# Patient Record
Sex: FEMALE | Race: OTHER | Employment: FULL TIME | URBAN - METROPOLITAN AREA
[De-identification: names, ages, dates, MRNs, and addresses within clinical notes are randomized per-mention and may not be internally consistent; named-entity substitution may affect disease eponyms.]

---

## 2018-06-24 ENCOUNTER — APPOINTMENT (OUTPATIENT)
Dept: CT IMAGING | Facility: HOSPITAL | Age: 23
End: 2018-06-24
Attending: EMERGENCY MEDICINE
Payer: COMMERCIAL

## 2018-06-24 ENCOUNTER — HOSPITAL ENCOUNTER (EMERGENCY)
Facility: HOSPITAL | Age: 23
Discharge: HOME | End: 2018-06-25
Attending: EMERGENCY MEDICINE
Payer: COMMERCIAL

## 2018-06-24 ENCOUNTER — APPOINTMENT (OUTPATIENT)
Dept: ULTRASOUND IMAGING | Facility: HOSPITAL | Age: 23
End: 2018-06-24
Attending: EMERGENCY MEDICINE
Payer: COMMERCIAL

## 2018-06-24 VITALS
DIASTOLIC BLOOD PRESSURE: 71 MMHG | TEMPERATURE: 98 F | OXYGEN SATURATION: 100 % | WEIGHT: 150 LBS | SYSTOLIC BLOOD PRESSURE: 115 MMHG | HEIGHT: 65 IN | HEART RATE: 93 BPM | RESPIRATION RATE: 16 BRPM | BODY MASS INDEX: 24.99 KG/M2

## 2018-06-24 DIAGNOSIS — O00.90 ECTOPIC PREGNANCY WITHOUT INTRAUTERINE PREGNANCY, UNSPECIFIED LOCATION: Primary | ICD-10-CM

## 2018-06-24 PROCEDURE — 96376 TX/PRO/DX INJ SAME DRUG ADON: CPT

## 2018-06-24 PROCEDURE — 93005 ELECTROCARDIOGRAM TRACING: CPT

## 2018-06-24 PROCEDURE — 76817 TRANSVAGINAL US OBSTETRIC: CPT | Performed by: EMERGENCY MEDICINE

## 2018-06-24 PROCEDURE — 86900 BLOOD TYPING SEROLOGIC ABO: CPT | Performed by: EMERGENCY MEDICINE

## 2018-06-24 PROCEDURE — 86850 RBC ANTIBODY SCREEN: CPT | Performed by: EMERGENCY MEDICINE

## 2018-06-24 PROCEDURE — 96372 THER/PROPH/DIAG INJ SC/IM: CPT

## 2018-06-24 PROCEDURE — 76801 OB US < 14 WKS SINGLE FETUS: CPT | Performed by: EMERGENCY MEDICINE

## 2018-06-24 PROCEDURE — 87086 URINE CULTURE/COLONY COUNT: CPT | Performed by: EMERGENCY MEDICINE

## 2018-06-24 PROCEDURE — 93010 ELECTROCARDIOGRAM REPORT: CPT

## 2018-06-24 PROCEDURE — 80053 COMPREHEN METABOLIC PANEL: CPT | Performed by: EMERGENCY MEDICINE

## 2018-06-24 PROCEDURE — 86901 BLOOD TYPING SEROLOGIC RH(D): CPT | Performed by: EMERGENCY MEDICINE

## 2018-06-24 PROCEDURE — 99285 EMERGENCY DEPT VISIT HI MDM: CPT

## 2018-06-24 PROCEDURE — 71275 CT ANGIOGRAPHY CHEST: CPT | Performed by: EMERGENCY MEDICINE

## 2018-06-24 PROCEDURE — 81001 URINALYSIS AUTO W/SCOPE: CPT | Performed by: EMERGENCY MEDICINE

## 2018-06-24 PROCEDURE — 85025 COMPLETE CBC W/AUTO DIFF WBC: CPT | Performed by: EMERGENCY MEDICINE

## 2018-06-24 PROCEDURE — 96374 THER/PROPH/DIAG INJ IV PUSH: CPT

## 2018-06-24 PROCEDURE — 96375 TX/PRO/DX INJ NEW DRUG ADDON: CPT

## 2018-06-24 PROCEDURE — 84484 ASSAY OF TROPONIN QUANT: CPT | Performed by: EMERGENCY MEDICINE

## 2018-06-24 PROCEDURE — 74177 CT ABD & PELVIS W/CONTRAST: CPT | Performed by: EMERGENCY MEDICINE

## 2018-06-24 PROCEDURE — 96361 HYDRATE IV INFUSION ADD-ON: CPT

## 2018-06-24 PROCEDURE — 84702 CHORIONIC GONADOTROPIN TEST: CPT | Performed by: EMERGENCY MEDICINE

## 2018-06-24 RX ORDER — HYDROCODONE BITARTRATE AND ACETAMINOPHEN 5; 325 MG/1; MG/1
1-2 TABLET ORAL EVERY 4 HOURS PRN
Qty: 10 TABLET | Refills: 0 | Status: SHIPPED | OUTPATIENT
Start: 2018-06-24 | End: 2018-07-01

## 2018-06-24 RX ORDER — HYDROMORPHONE HYDROCHLORIDE 1 MG/ML
INJECTION, SOLUTION INTRAMUSCULAR; INTRAVENOUS; SUBCUTANEOUS
Status: COMPLETED
Start: 2018-06-24 | End: 2018-06-24

## 2018-06-24 RX ORDER — ESCITALOPRAM OXALATE 10 MG/1
10 TABLET ORAL DAILY
Status: ON HOLD | COMMUNITY
End: 2018-09-05

## 2018-06-24 RX ORDER — ONDANSETRON 2 MG/ML
4 INJECTION INTRAMUSCULAR; INTRAVENOUS ONCE
Status: COMPLETED | OUTPATIENT
Start: 2018-06-24 | End: 2018-06-24

## 2018-06-24 RX ORDER — LAMOTRIGINE 200 MG/1
200 TABLET ORAL DAILY
Status: ON HOLD | COMMUNITY
End: 2018-09-05

## 2018-06-24 RX ORDER — LORAZEPAM 2 MG/ML
1 INJECTION INTRAMUSCULAR ONCE
Status: COMPLETED | OUTPATIENT
Start: 2018-06-24 | End: 2018-06-24

## 2018-06-24 RX ORDER — ALPRAZOLAM 0.5 MG/1
0.5 TABLET ORAL NIGHTLY PRN
Status: ON HOLD | COMMUNITY
End: 2018-09-05

## 2018-06-24 RX ORDER — HYDROMORPHONE HYDROCHLORIDE 1 MG/ML
1 INJECTION, SOLUTION INTRAMUSCULAR; INTRAVENOUS; SUBCUTANEOUS EVERY 30 MIN PRN
Status: COMPLETED | OUTPATIENT
Start: 2018-06-24 | End: 2018-06-24

## 2018-06-25 NOTE — ED PROVIDER NOTES
Patient Seen in: [facility] Emergency Department    History   Patient presents with:  Pregnancy Issues (gynecologic)    Stated Complaint: RO ECTOPIC PREG    HPI    Patient is a 78-year-old  pregnant female whose EGA is approximately 8 weeks by except as noted above.     Physical Exam   ED Triage Vitals [06/24/18 1944]  BP: (!) 89/66  Pulse: 83  Resp: 17  Temp: 98.1 °F (36.7 °C)  Temp src: Oral  SpO2: 100 %  O2 Device: None (Room air)    Current:/81   Pulse 97   Temp 98.1 °F (36.7 °C) (Oral) following:     Ketones Urine Trace (*)     Leukocyte Esterase Urine Trace (*)     Bacteria Urine Rare (*)     Squamous Epi.  Cells Large (*)     Mucous Urine 1+ (*)     All other components within normal limits   CBC W/ DIFFERENTIAL - Abnormal; Notable for 2227  ------------------------------------------------------------  On arrival of the patient an EKG was obtained which showed no acute process. The patient was placed on continuous pulse oximetry and cardiac telemetry.   She was found to be hypotensive an WALL:  No mass or axillary adenopathy. AORTA:  No aneurysm or dissection. VASCULATURE:  No visible pulmonary arterial thrombus or attenuation. ABDOMEN/PELVIS:  LIVER:  No enlargement, atrophy, abnormal density, or significant focal lesion.     B pregnancy, unspecified location  (primary encounter diagnosis)    Disposition:  Discharge  6/24/2018 10:27 pm    Follow-up:  Tyler Sotelo MD  [place] [address] [phone]    Call in 1 day  for repeat labs and exam on Tuesday

## 2018-06-25 NOTE — ED NOTES
Medication per [facility], patient continues to c/o uncontrolled pain and appears in pain distress. Plan of care discussed and emotional support provided.

## 2018-06-25 NOTE — ED INITIAL ASSESSMENT (HPI)
Patient arrives from home with family c/o right abdominal and flank pain. States should be approximately 8 weeks gestation [facility]. Per patient was told possible ectopic and go to ER had pain.  She d

## 2018-06-25 NOTE — ED NOTES
Patient tearful continues to c/o uncontrolled pain and is c/o severe anxiety. Medication per [facility], emotional support provided.

## 2018-06-26 ENCOUNTER — ANESTHESIA EVENT (OUTPATIENT)
Dept: SURGERY | Facility: HOSPITAL | Age: 23
End: 2018-06-26

## 2018-06-26 ENCOUNTER — HOSPITAL ENCOUNTER (EMERGENCY)
Facility: HOSPITAL | Age: 23
Discharge: HOME | End: 2018-06-26
Attending: EMERGENCY MEDICINE | Admitting: OBSTETRICS & GYNECOLOGY
Payer: COMMERCIAL

## 2018-06-26 ENCOUNTER — ANESTHESIA (OUTPATIENT)
Dept: SURGERY | Facility: HOSPITAL | Age: 23
End: 2018-06-26

## 2018-06-26 ENCOUNTER — SURGERY (OUTPATIENT)
Age: 23
End: 2018-06-26

## 2018-06-26 ENCOUNTER — OFFICE VISIT (OUTPATIENT)
Dept: OBGYN CLINIC | Facility: CLINIC | Age: 23
End: 2018-06-26

## 2018-06-26 VITALS
DIASTOLIC BLOOD PRESSURE: 60 MMHG | BODY MASS INDEX: 26.03 KG/M2 | HEIGHT: 66 IN | HEART RATE: 88 BPM | WEIGHT: 162 LBS | SYSTOLIC BLOOD PRESSURE: 112 MMHG

## 2018-06-26 VITALS
TEMPERATURE: 98 F | HEIGHT: 65 IN | DIASTOLIC BLOOD PRESSURE: 81 MMHG | HEART RATE: 88 BPM | SYSTOLIC BLOOD PRESSURE: 107 MMHG | BODY MASS INDEX: 26.66 KG/M2 | RESPIRATION RATE: 18 BRPM | OXYGEN SATURATION: 97 % | WEIGHT: 160 LBS

## 2018-06-26 DIAGNOSIS — O00.90 ECTOPIC PREGNANCY WITHOUT INTRAUTERINE PREGNANCY, UNSPECIFIED LOCATION: Primary | ICD-10-CM

## 2018-06-26 DIAGNOSIS — O00.00 ABDOMINAL PREGNANCY WITHOUT INTRAUTERINE PREGNANCY: Primary | ICD-10-CM

## 2018-06-26 DIAGNOSIS — Z01.419 WELL WOMAN EXAM WITH ROUTINE GYNECOLOGICAL EXAM: ICD-10-CM

## 2018-06-26 PROBLEM — R10.31 ABDOMINAL PAIN, RIGHT LOWER QUADRANT: Status: ACTIVE | Noted: 2018-06-26

## 2018-06-26 LAB
ALBUMIN SERPL-MCNC: 3.7 G/DL (ref 3.5–4.8)
ALP LIVER SERPL-CCNC: 54 U/L (ref 37–98)
ALT SERPL-CCNC: 25 U/L (ref 14–54)
AST SERPL-CCNC: 20 U/L (ref 15–41)
BASOPHILS # BLD AUTO: 0.05 X10(3) UL (ref 0–0.1)
BASOPHILS NFR BLD AUTO: 0.5 %
BILIRUB SERPL-MCNC: 0.4 MG/DL (ref 0.1–2)
BUN BLD-MCNC: 10 MG/DL (ref 8–20)
CALCIUM BLD-MCNC: 9.5 MG/DL (ref 8.3–10.3)
CHLORIDE: 107 MMOL/L (ref 101–111)
CO2: 21 MMOL/L (ref 22–32)
CREAT BLD-MCNC: 0.91 MG/DL (ref 0.55–1.02)
EOSINOPHIL # BLD AUTO: 0.14 X10(3) UL (ref 0–0.3)
EOSINOPHIL NFR BLD AUTO: 1.3 %
ERYTHROCYTE [DISTWIDTH] IN BLOOD BY AUTOMATED COUNT: 13 % (ref 11.5–16)
GLUCOSE BLD-MCNC: 73 MG/DL (ref 70–99)
HCG QUANTITATIVE: 1167 MIU/ML (ref ?–3)
HCT VFR BLD AUTO: 38.1 % (ref 34–50)
HGB BLD-MCNC: 12.5 G/DL (ref 12–16)
IMMATURE GRANULOCYTE COUNT: 0.02 X10(3) UL (ref 0–1)
IMMATURE GRANULOCYTE RATIO %: 0.2 %
LYMPHOCYTES # BLD AUTO: 2.66 X10(3) UL (ref 0.9–4)
LYMPHOCYTES NFR BLD AUTO: 24 %
M PROTEIN MFR SERPL ELPH: 7.1 G/DL (ref 6.1–8.3)
MCH RBC QN AUTO: 29.3 PG (ref 27–33.2)
MCHC RBC AUTO-ENTMCNC: 32.8 G/DL (ref 31–37)
MCV RBC AUTO: 89.2 FL (ref 81–100)
MONOCYTES # BLD AUTO: 0.51 X10(3) UL (ref 0.1–1)
MONOCYTES NFR BLD AUTO: 4.6 %
NEUTROPHIL ABS PRELIM: 7.69 X10 (3) UL (ref 1.3–6.7)
NEUTROPHILS # BLD AUTO: 7.69 X10(3) UL (ref 1.3–6.7)
NEUTROPHILS NFR BLD AUTO: 69.4 %
PLATELET # BLD AUTO: 280 10(3)UL (ref 150–450)
POTASSIUM SERPL-SCNC: 3.4 MMOL/L (ref 3.6–5.1)
RBC # BLD AUTO: 4.27 X10(6)UL (ref 3.8–5.1)
RED CELL DISTRIBUTION WIDTH-SD: 42.5 FL (ref 35.1–46.3)
SODIUM SERPL-SCNC: 138 MMOL/L (ref 136–144)
WBC # BLD AUTO: 11.1 X10(3) UL (ref 4–13)

## 2018-06-26 PROCEDURE — 87591 N.GONORRHOEAE DNA AMP PROB: CPT | Performed by: OBSTETRICS & GYNECOLOGY

## 2018-06-26 PROCEDURE — 49320 DIAG LAPARO SEPARATE PROC: CPT | Performed by: OBSTETRICS & GYNECOLOGY

## 2018-06-26 PROCEDURE — 99385 PREV VISIT NEW AGE 18-39: CPT | Performed by: OBSTETRICS & GYNECOLOGY

## 2018-06-26 PROCEDURE — 87491 CHLMYD TRACH DNA AMP PROBE: CPT | Performed by: OBSTETRICS & GYNECOLOGY

## 2018-06-26 PROCEDURE — 10J24ZZ INSPECTION OF PRODUCTS OF CONCEPTION, ECTOPIC, PERCUTANEOUS ENDOSCOPIC APPROACH: ICD-10-PCS | Performed by: OBSTETRICS & GYNECOLOGY

## 2018-06-26 RX ORDER — MIDAZOLAM HYDROCHLORIDE 1 MG/ML
INJECTION INTRAMUSCULAR; INTRAVENOUS
Status: COMPLETED
Start: 2018-06-26 | End: 2018-06-26

## 2018-06-26 RX ORDER — HYDROMORPHONE HYDROCHLORIDE 1 MG/ML
INJECTION, SOLUTION INTRAMUSCULAR; INTRAVENOUS; SUBCUTANEOUS
Status: COMPLETED
Start: 2018-06-26 | End: 2018-06-26

## 2018-06-26 RX ORDER — MEPERIDINE HYDROCHLORIDE 25 MG/ML
INJECTION INTRAMUSCULAR; INTRAVENOUS; SUBCUTANEOUS
Status: COMPLETED
Start: 2018-06-26 | End: 2018-06-26

## 2018-06-26 RX ORDER — LIDOCAINE HYDROCHLORIDE 10 MG/ML
INJECTION, SOLUTION INFILTRATION; PERINEURAL AS NEEDED
Status: DISCONTINUED | OUTPATIENT
Start: 2018-06-26 | End: 2018-06-26 | Stop reason: HOSPADM

## 2018-06-26 RX ORDER — HYDROCODONE BITARTRATE AND ACETAMINOPHEN 5; 325 MG/1; MG/1
TABLET ORAL
Status: DISCONTINUED
Start: 2018-06-26 | End: 2018-06-26

## 2018-06-26 RX ORDER — NALOXONE HYDROCHLORIDE 0.4 MG/ML
80 INJECTION, SOLUTION INTRAMUSCULAR; INTRAVENOUS; SUBCUTANEOUS AS NEEDED
Status: DISCONTINUED | OUTPATIENT
Start: 2018-06-26 | End: 2018-06-26

## 2018-06-26 RX ORDER — MIDAZOLAM HYDROCHLORIDE 1 MG/ML
1 INJECTION INTRAMUSCULAR; INTRAVENOUS EVERY 5 MIN PRN
Status: DISCONTINUED | OUTPATIENT
Start: 2018-06-26 | End: 2018-06-26

## 2018-06-26 RX ORDER — HYDROCODONE BITARTRATE AND ACETAMINOPHEN 5; 325 MG/1; MG/1
2 TABLET ORAL EVERY 4 HOURS PRN
Status: DISCONTINUED | OUTPATIENT
Start: 2018-06-26 | End: 2018-06-26

## 2018-06-26 RX ORDER — HYDROMORPHONE HYDROCHLORIDE 1 MG/ML
0.4 INJECTION, SOLUTION INTRAMUSCULAR; INTRAVENOUS; SUBCUTANEOUS EVERY 5 MIN PRN
Status: DISCONTINUED | OUTPATIENT
Start: 2018-06-26 | End: 2018-06-26

## 2018-06-26 RX ORDER — SODIUM CHLORIDE 9 MG/ML
INJECTION, SOLUTION INTRAVENOUS ONCE
Status: COMPLETED | OUTPATIENT
Start: 2018-06-26 | End: 2018-06-26

## 2018-06-26 RX ORDER — MEPERIDINE HYDROCHLORIDE 25 MG/ML
12.5 INJECTION INTRAMUSCULAR; INTRAVENOUS; SUBCUTANEOUS AS NEEDED
Status: DISCONTINUED | OUTPATIENT
Start: 2018-06-26 | End: 2018-06-26

## 2018-06-26 RX ORDER — SODIUM CHLORIDE 9 MG/ML
INJECTION, SOLUTION INTRAVENOUS CONTINUOUS
Status: DISCONTINUED | OUTPATIENT
Start: 2018-06-26 | End: 2018-06-26

## 2018-06-26 RX ORDER — DIPHENHYDRAMINE HYDROCHLORIDE 50 MG/ML
12.5 INJECTION INTRAMUSCULAR; INTRAVENOUS AS NEEDED
Status: DISCONTINUED | OUTPATIENT
Start: 2018-06-26 | End: 2018-06-26

## 2018-06-26 NOTE — ANESTHESIA POSTPROCEDURE EVALUATION
[address] Patient Status:  Emergency   Age/Gender 23year old female MRN [id number]   Location [address] [facility] Attending Erick [address], [facility], [address] [facility] Day # 0 PCP None Pcp       Anesthesia Pos

## 2018-06-26 NOTE — ED INITIAL ASSESSMENT (HPI)
Patient dx with ectopic pregnancy 2 days ago, sent home. Now with worsening right sided sharp pain, some intermittent spotting.

## 2018-06-26 NOTE — H&P
Jason Schilling is a 23year old female  Patient's last menstrual period was 2018. Patient presents with:  Eval-G (gynecologic)  . Patient has been treated with methotrexate for ectopic pregnancy 2 days ago came back with worsening numbness. Psychiatric: denies depression or anxiety. Endocrine:   denies excessive thirst or urination. Heme/Lymph:  denies history of anemia, easy bruising or bleeding.       PHYSICAL EXAM:   Constitutional: well developed, well nourished  Head/Face: no

## 2018-06-26 NOTE — ANESTHESIA PREPROCEDURE EVALUATION
PRE-OP EVALUATION    Patient Name: Jason Schilling    Pre-op Diagnosis: Ectropion [I41.429]    Procedure(s):  LAPAROSCOPIC RIGHT SALPINGECTOMY    Surgeon(s) and Role:     * Albania Hunter,  - Primary    Pre-op vitals reviewed.   Temp: 97 CO2 21.0 (L) 06/26/2018   BUN 10 06/26/2018   CREATSERUM 0.91 06/26/2018   GLU 73 06/26/2018   CA 9.5 06/26/2018            Airway      Mallampati: I  Mouth opening: >3 FB  TM distance: > 6 cm  Neck ROM: full Cardiovascular    Cardiovascular exam normal.

## 2018-06-26 NOTE — BRIEF OP NOTE
Pre-Operative Diagnosis: Ectropion [H02.109]     Post-Operative Diagnosis: Ectropion [H02.109]      Procedure Performed:   Procedure(s):  Diagnostic Laparoscopy     Surgeon(s) and Role:     * Albania Hunter DO - Samra    Assistant(s):        Surgical

## 2018-06-26 NOTE — PROGRESS NOTES
Gregoria Viramontes is a 23year old female No obstetric history on file. No LMP recorded. Patient presents with: Other: ER F/U due to ectpoic pregnancy  . She was in the ER on Sunday. Does not desire this pregnancy.   Her last period was Take 200 mg by mouth daily. , Disp: , Rfl:   •  HYDROcodone-acetaminophen 5-325 MG Oral Tab, Take 1-2 tablets by mouth every 4 (four) hours as needed for Pain., Disp: 10 tablet, Rfl: 0    ALLERGIES:  No Known Allergies      Review of Systems:  Constitutiona

## 2018-06-26 NOTE — ED PROVIDER NOTES
Patient Seen in: [address]   Patient presents with:  Eval-TRIPP (gynecologic)    Stated Complaint: abd pain. Confirmed Ectopic 2 days ago. HPI    23-year-old female returns for evaluation abdominal pain.   She was seen by my in the right lower quadrant but there are no peritoneal signs. Musculoskeletal: Exhibits no edema or tenderness. Neurological: Pt is alert and oriented to person, place, and time. No cranial nerve deficit. Skin: Skin is warm and dry.    Psychiatric: No on the ultrasound done 2 days ago as well. She has been hemodynamically stable throughout her ER stay. Care was discussed with Dr. Linda Escamilla, on for Dr. Janes Anderson. Plans on taking the patient to the operating room. Patient aware of plan.   We will madeline

## 2018-06-27 ENCOUNTER — OFFICE VISIT (OUTPATIENT)
Dept: OBGYN CLINIC | Facility: CLINIC | Age: 23
End: 2018-06-27

## 2018-06-27 ENCOUNTER — MED REC SCAN ONLY (OUTPATIENT)
Dept: OBGYN CLINIC | Facility: CLINIC | Age: 23
End: 2018-06-27

## 2018-06-27 VITALS — BODY MASS INDEX: 26.03 KG/M2 | HEIGHT: 66 IN | WEIGHT: 162 LBS

## 2018-06-27 DIAGNOSIS — O20.0 THREATENED MISCARRIAGE: Primary | ICD-10-CM

## 2018-06-27 DIAGNOSIS — O00.90 ECTOPIC PREGNANCY WITHOUT INTRAUTERINE PREGNANCY, UNSPECIFIED LOCATION: ICD-10-CM

## 2018-06-27 PROCEDURE — 99024 POSTOP FOLLOW-UP VISIT: CPT | Performed by: OBSTETRICS & GYNECOLOGY

## 2018-06-27 RX ORDER — TRAMADOL HYDROCHLORIDE 50 MG/1
50 TABLET ORAL EVERY 6 HOURS PRN
Qty: 20 TABLET | Refills: 0 | Status: SHIPPED | OUTPATIENT
Start: 2018-06-27 | End: 2018-06-28

## 2018-06-27 NOTE — PROGRESS NOTES
She is bleeding like a period now. No cramping. Operative findings were discussed. She wishes to terminate the pregnancy will [facility] to [facility]  pill. Desires a Nexplanon to follow. Will check hCG.

## 2018-06-27 NOTE — OPERATIVE REPORT
[facility]    PATIENT'S NAME: Beck Youssef   ATTENDING PHYSICIAN: Yaa Sanchez D.O.   OPERATING PHYSICIAN: Yaa Sanchez D.O.   PATIENT ACCOUNT#:   [id number]    [facility]  MEDICAL RECORD #:   [id number] the procedure well; was taken to the recovery room in stable condition.     Dictated By Lucien Luciano D.O.  d: 06/26/2018 18:26:53  t: 06/26/2018 19:16:26  [facility] [id number]  /

## 2018-06-28 ENCOUNTER — HOSPITAL ENCOUNTER (OUTPATIENT)
Dept: MRI IMAGING | Facility: HOSPITAL | Age: 23
Discharge: HOME | End: 2018-06-28
Attending: OBSTETRICS & GYNECOLOGY
Payer: COMMERCIAL

## 2018-06-28 ENCOUNTER — LAB ENCOUNTER (OUTPATIENT)
Dept: LAB | Facility: HOSPITAL | Age: 23
End: 2018-06-28
Attending: OBSTETRICS & GYNECOLOGY
Payer: COMMERCIAL

## 2018-06-28 ENCOUNTER — TELEPHONE (OUTPATIENT)
Dept: OBGYN CLINIC | Facility: CLINIC | Age: 23
End: 2018-06-28

## 2018-06-28 ENCOUNTER — ANESTHESIA EVENT (OUTPATIENT)
Dept: SURGERY | Facility: HOSPITAL | Age: 23
End: 2018-06-28
Payer: COMMERCIAL

## 2018-06-28 ENCOUNTER — APPOINTMENT (OUTPATIENT)
Dept: OBGYN CLINIC | Facility: CLINIC | Age: 23
End: 2018-06-28

## 2018-06-28 DIAGNOSIS — O36.80X0 PREGNANCY OF UNKNOWN ANATOMIC LOCATION: Primary | ICD-10-CM

## 2018-06-28 DIAGNOSIS — O36.80X0 PREGNANCY OF UNKNOWN ANATOMIC LOCATION: ICD-10-CM

## 2018-06-28 DIAGNOSIS — O20.0 THREATENED MISCARRIAGE: ICD-10-CM

## 2018-06-28 DIAGNOSIS — O00.80 OTHER ECTOPIC PREGNANCY, UNSPECIFIED WHETHER INTRAUTERINE PREGNANCY PRESENT: Primary | ICD-10-CM

## 2018-06-28 PROCEDURE — 36415 COLL VENOUS BLD VENIPUNCTURE: CPT

## 2018-06-28 PROCEDURE — A9576 INJ PROHANCE MULTIPACK: HCPCS

## 2018-06-28 PROCEDURE — 72197 MRI PELVIS W/O & W/DYE: CPT | Performed by: OBSTETRICS & GYNECOLOGY

## 2018-06-28 PROCEDURE — 84702 CHORIONIC GONADOTROPIN TEST: CPT

## 2018-06-28 NOTE — TELEPHONE ENCOUNTER
Per pt she was prescribed tramadol and she got a bad reaction with itchiness, and bumps everywhere. Pt is asking if we can prescribe something different. Please advise and call pt.  Thanks

## 2018-06-28 NOTE — TELEPHONE ENCOUNTER
Per Dr. Suarez 38, MRI of pelvis with contrast Stat schedule for 4:30 pm today and Hysteroscopy, D&C schedule for tomorrow at 09:15 AM. Patient aware. Multiple questions answered.  Patient states that she will speak to Dr. Suarez 38 tomorrow about changing

## 2018-06-28 NOTE — TELEPHONE ENCOUNTER
[id number] auth not req per Luis Felipe kendrick/ loretta swan. ref # [id number]. fyi per yovanny on 06/30/2018 ins will term.

## 2018-06-29 ENCOUNTER — ANESTHESIA (OUTPATIENT)
Dept: SURGERY | Facility: HOSPITAL | Age: 23
End: 2018-06-29
Payer: COMMERCIAL

## 2018-06-29 ENCOUNTER — SURGERY (OUTPATIENT)
Age: 23
End: 2018-06-29

## 2018-06-29 ENCOUNTER — HOSPITAL ENCOUNTER (OUTPATIENT)
Facility: HOSPITAL | Age: 23
Setting detail: HOSPITAL OUTPATIENT SURGERY
Discharge: HOME | End: 2018-06-29
Attending: OBSTETRICS & GYNECOLOGY | Admitting: OBSTETRICS & GYNECOLOGY
Payer: COMMERCIAL

## 2018-06-29 VITALS
RESPIRATION RATE: 18 BRPM | WEIGHT: 156.5 LBS | SYSTOLIC BLOOD PRESSURE: 91 MMHG | TEMPERATURE: 97 F | HEIGHT: 65 IN | HEART RATE: 75 BPM | OXYGEN SATURATION: 99 % | DIASTOLIC BLOOD PRESSURE: 55 MMHG | BODY MASS INDEX: 26.08 KG/M2

## 2018-06-29 PROCEDURE — 0UDB8ZX EXTRACTION OF ENDOMETRIUM, VIA NATURAL OR ARTIFICIAL OPENING ENDOSCOPIC, DIAGNOSTIC: ICD-10-PCS | Performed by: OBSTETRICS & GYNECOLOGY

## 2018-06-29 PROCEDURE — 58558 HYSTEROSCOPY BIOPSY: CPT | Performed by: OBSTETRICS & GYNECOLOGY

## 2018-06-29 RX ORDER — ACETAMINOPHEN 500 MG
1000 TABLET ORAL ONCE AS NEEDED
Status: DISCONTINUED | OUTPATIENT
Start: 2018-06-29 | End: 2018-06-29

## 2018-06-29 RX ORDER — LIDOCAINE HYDROCHLORIDE 10 MG/ML
INJECTION, SOLUTION INFILTRATION; PERINEURAL AS NEEDED
Status: DISCONTINUED | OUTPATIENT
Start: 2018-06-29 | End: 2018-06-29

## 2018-06-29 RX ORDER — SODIUM CHLORIDE, SODIUM LACTATE, POTASSIUM CHLORIDE, CALCIUM CHLORIDE 600; 310; 30; 20 MG/100ML; MG/100ML; MG/100ML; MG/100ML
INJECTION, SOLUTION INTRAVENOUS CONTINUOUS
Status: DISCONTINUED | OUTPATIENT
Start: 2018-06-29 | End: 2018-06-29

## 2018-06-29 RX ORDER — NALOXONE HYDROCHLORIDE 0.4 MG/ML
80 INJECTION, SOLUTION INTRAMUSCULAR; INTRAVENOUS; SUBCUTANEOUS AS NEEDED
Status: DISCONTINUED | OUTPATIENT
Start: 2018-06-29 | End: 2018-06-29

## 2018-06-29 RX ORDER — MORPHINE SULFATE 4 MG/ML
2 INJECTION, SOLUTION INTRAMUSCULAR; INTRAVENOUS EVERY 5 MIN PRN
Status: DISCONTINUED | OUTPATIENT
Start: 2018-06-29 | End: 2018-06-29

## 2018-06-29 RX ORDER — HYDROCODONE BITARTRATE AND ACETAMINOPHEN 5; 325 MG/1; MG/1
2 TABLET ORAL AS NEEDED
Status: COMPLETED | OUTPATIENT
Start: 2018-06-29 | End: 2018-06-29

## 2018-06-29 RX ORDER — CEFAZOLIN SODIUM 1 G/3ML
INJECTION, POWDER, FOR SOLUTION INTRAMUSCULAR; INTRAVENOUS
Status: DISCONTINUED | OUTPATIENT
Start: 2018-06-29 | End: 2018-06-29

## 2018-06-29 RX ORDER — HYDROCODONE BITARTRATE AND ACETAMINOPHEN 5; 325 MG/1; MG/1
1 TABLET ORAL AS NEEDED
Status: COMPLETED | OUTPATIENT
Start: 2018-06-29 | End: 2018-06-29

## 2018-06-29 RX ORDER — ACETAMINOPHEN 500 MG
1000 TABLET ORAL ONCE
Status: COMPLETED | OUTPATIENT
Start: 2018-06-29 | End: 2018-06-29

## 2018-06-29 NOTE — ANESTHESIA POSTPROCEDURE EVALUATION
[address] Patient Status:  Hospital Outpatient Surgery   Age/Gender 23year old female MRN [id number]   Location [address] [place] [address] [facility] Day # 0 PCP None Pcp       Anesthe

## 2018-06-29 NOTE — ANESTHESIA PREPROCEDURE EVALUATION
PRE-OP EVALUATION    Patient Name: Mckenna Murrell    Pre-op Diagnosis: PREGNANCY OF UNKNOWN LOCATION    Procedure(s):   HYSTEROSCOPY DILATION AND CURETTAGE    Surgeon(s) and Role:     * Eufemia Hunter DO - Primary    Pre-op vitals review social       Drug use: No     Available pre-op labs reviewed.     Lab Results  Component Value Date   WBC 11.1 06/26/2018   RBC 4.27 06/26/2018   HGB 12.5 06/26/2018   HCT 38.1 06/26/2018   MCV 89.2 06/26/2018   MCH 29.3 06/26/2018   MCHC 32.8 06/26/2018

## 2018-06-29 NOTE — H&P
Danny Nettles is a 23year old female  Patient's last menstrual period was 2018. No chief complaint on file.   .  Patient had been evaluated pelvic pain with positive pregnancy, unknown location, suspected ectopic rtreated with m vision  Cardiovascular:  denies chest pain or palpitations  Respiratory:  denies shortness of breath  Gastrointestinal:  denies heartburn, abdominal pain, diarrhea or constipation  Genitourinary:  denies dysuria, incontinence, abnormal vaginal discharge, v

## 2018-06-29 NOTE — PROGRESS NOTES
Patient aware of results and recommendations.  STAT MRI schedule and hysteroscopy D&C schedule for 6/29 at 0915 AM. Patient verbalizes understending

## 2018-06-29 NOTE — OPERATIVE REPORT
Pre-op Dx: pregnancy of unknown location  Post-op Dx:same   Surgeon: Lissa Vernon  Procedure: Hysteroscopy D&C  Complications: none  Findings: 8 cm uterus, local thickening of endometrium  Procedure note: Pt was taken to the O.R.  Where anesthesia was obtai

## 2018-06-30 RX ORDER — ONDANSETRON 4 MG/1
4 TABLET, ORALLY DISINTEGRATING ORAL EVERY 8 HOURS PRN
Qty: 12 TABLET | Refills: 0 | Status: ON HOLD | OUTPATIENT
Start: 2018-06-30 | End: 2018-09-05

## 2018-07-01 ENCOUNTER — TELEPHONE (OUTPATIENT)
Dept: OBGYN CLINIC | Facility: CLINIC | Age: 23
End: 2018-07-01

## 2018-07-01 NOTE — TELEPHONE ENCOUNTER
Patient called with c/o of nausea , feels feverish but does not have a thermometer. Not sure about chills. States that she is pregnant but location cannot be determined. Some vomiting. Arch Renetta Pelvic pain. Patient was advise to go to E/R for evaluation now.  She

## 2018-07-02 ENCOUNTER — LAB ENCOUNTER (OUTPATIENT)
Dept: LAB | Age: 23
End: 2018-07-02
Attending: OBSTETRICS & GYNECOLOGY
Payer: COMMERCIAL

## 2018-07-02 DIAGNOSIS — O00.00 ABDOMINAL PREGNANCY WITHOUT INTRAUTERINE PREGNANCY: ICD-10-CM

## 2018-07-02 LAB — HCG QUANTITATIVE: 303 MIU/ML (ref ?–3)

## 2018-07-02 PROCEDURE — 84702 CHORIONIC GONADOTROPIN TEST: CPT | Performed by: OBSTETRICS & GYNECOLOGY

## 2018-07-03 ENCOUNTER — TELEPHONE (OUTPATIENT)
Dept: OBGYN CLINIC | Facility: CLINIC | Age: 23
End: 2018-07-03

## 2018-07-03 DIAGNOSIS — O03.9 SAB (SPONTANEOUS ABORTION): Primary | ICD-10-CM

## 2018-07-03 RX ORDER — LAMOTRIGINE 200 MG/1
200 TABLET ORAL DAILY
Qty: 30 TABLET | Refills: 0 | Status: SHIPPED | OUTPATIENT
Start: 2018-07-03 | End: 2018-07-20

## 2018-07-03 NOTE — TELEPHONE ENCOUNTER
Patient states that she is out of her Lamictal 200mg that she takes daily, she tried to get a refill from her psych MD (Dr. Yoni Agosto) but was told to contact our office. Per. Dr. Darya Jaramillo, one month refill given to patient.  Patient instructed to schedule amber

## 2018-07-11 ENCOUNTER — TELEPHONE (OUTPATIENT)
Dept: OBGYN CLINIC | Facility: CLINIC | Age: 23
End: 2018-07-11

## 2018-07-20 ENCOUNTER — OFFICE VISIT (OUTPATIENT)
Dept: OBGYN CLINIC | Facility: CLINIC | Age: 23
End: 2018-07-20
Payer: COMMERCIAL

## 2018-07-20 VITALS
HEIGHT: 66 IN | HEART RATE: 80 BPM | BODY MASS INDEX: 24.27 KG/M2 | SYSTOLIC BLOOD PRESSURE: 112 MMHG | DIASTOLIC BLOOD PRESSURE: 62 MMHG | WEIGHT: 151 LBS

## 2018-07-20 DIAGNOSIS — O02.0 MOLAR PREGNANCY: Primary | ICD-10-CM

## 2018-07-20 PROBLEM — O00.90 ECTOPIC PREGNANCY WITHOUT INTRAUTERINE PREGNANCY, UNSPECIFIED LOCATION: Status: RESOLVED | Noted: 2018-06-26 | Resolved: 2018-07-20

## 2018-07-20 PROBLEM — O00.90 ECTOPIC PREGNANCY WITHOUT INTRAUTERINE PREGNANCY: Status: RESOLVED | Noted: 2018-06-26 | Resolved: 2018-07-20

## 2018-07-20 PROCEDURE — 99212 OFFICE O/P EST SF 10 MIN: CPT | Performed by: OBSTETRICS & GYNECOLOGY

## 2018-07-23 ENCOUNTER — MED REC SCAN ONLY (OUTPATIENT)
Dept: OBGYN CLINIC | Facility: CLINIC | Age: 23
End: 2018-07-23

## 2018-07-27 ENCOUNTER — HOSPITAL ENCOUNTER (EMERGENCY)
Facility: HOSPITAL | Age: 23
Discharge: HOME | End: 2018-07-27
Attending: EMERGENCY MEDICINE

## 2018-07-27 ENCOUNTER — APPOINTMENT (OUTPATIENT)
Dept: CT IMAGING | Facility: HOSPITAL | Age: 23
End: 2018-07-27
Attending: EMERGENCY MEDICINE

## 2018-07-27 ENCOUNTER — APPOINTMENT (OUTPATIENT)
Dept: ULTRASOUND IMAGING | Facility: HOSPITAL | Age: 23
End: 2018-07-27
Attending: EMERGENCY MEDICINE

## 2018-07-27 VITALS
DIASTOLIC BLOOD PRESSURE: 96 MMHG | SYSTOLIC BLOOD PRESSURE: 107 MMHG | OXYGEN SATURATION: 100 % | TEMPERATURE: 99 F | HEART RATE: 74 BPM | HEIGHT: 65 IN | BODY MASS INDEX: 24.99 KG/M2 | RESPIRATION RATE: 18 BRPM | WEIGHT: 150 LBS

## 2018-07-27 DIAGNOSIS — R11.2 NAUSEA VOMITING AND DIARRHEA: ICD-10-CM

## 2018-07-27 DIAGNOSIS — R19.7 NAUSEA VOMITING AND DIARRHEA: ICD-10-CM

## 2018-07-27 DIAGNOSIS — S09.90XA INJURY OF HEAD, INITIAL ENCOUNTER: ICD-10-CM

## 2018-07-27 DIAGNOSIS — R10.9 ABDOMINAL PAIN OF UNKNOWN ETIOLOGY: Primary | ICD-10-CM

## 2018-07-27 LAB
ALBUMIN SERPL-MCNC: 4.2 G/DL (ref 3.5–4.8)
ALBUMIN/GLOB SERPL: 1.3 {RATIO} (ref 1–2)
ALP LIVER SERPL-CCNC: 53 U/L (ref 37–98)
ALT SERPL-CCNC: 29 U/L (ref 14–54)
ANION GAP SERPL CALC-SCNC: 13 MMOL/L (ref 0–18)
AST SERPL-CCNC: 20 U/L (ref 15–41)
B-HCG SERPL-ACNC: <1 MIU/ML (ref ?–3)
BASOPHILS # BLD AUTO: 0.06 X10(3) UL (ref 0–0.1)
BASOPHILS NFR BLD AUTO: 0.6 %
BILIRUB SERPL-MCNC: 0.6 MG/DL (ref 0.1–2)
BILIRUB UR QL STRIP.AUTO: NEGATIVE
BUN BLD-MCNC: 11 MG/DL (ref 8–20)
BUN/CREAT SERPL: 10.4 (ref 10–20)
CALCIUM BLD-MCNC: 9.4 MG/DL (ref 8.3–10.3)
CHLORIDE SERPL-SCNC: 108 MMOL/L (ref 101–111)
CO2 SERPL-SCNC: 20 MMOL/L (ref 22–32)
COLOR UR AUTO: YELLOW
CREAT BLD-MCNC: 1.06 MG/DL (ref 0.55–1.02)
EOSINOPHIL # BLD AUTO: 0.09 X10(3) UL (ref 0–0.3)
EOSINOPHIL NFR BLD AUTO: 0.9 %
ERYTHROCYTE [DISTWIDTH] IN BLOOD BY AUTOMATED COUNT: 12.7 % (ref 11.5–16)
GLOBULIN PLAS-MCNC: 3.3 G/DL (ref 2.5–3.7)
GLUCOSE BLD-MCNC: 113 MG/DL (ref 70–99)
GLUCOSE UR STRIP.AUTO-MCNC: NEGATIVE MG/DL
HCT VFR BLD AUTO: 40.8 % (ref 34–50)
HGB BLD-MCNC: 13.7 G/DL (ref 12–16)
HYALINE CASTS #/AREA URNS AUTO: PRESENT /LPF
IMMATURE GRANULOCYTE COUNT: 0.03 X10(3) UL (ref 0–1)
IMMATURE GRANULOCYTE RATIO %: 0.3 %
KETONES UR STRIP.AUTO-MCNC: 80 MG/DL
LIPASE: 75 U/L (ref 73–393)
LYMPHOCYTES # BLD AUTO: 2.88 X10(3) UL (ref 0.9–4)
LYMPHOCYTES NFR BLD AUTO: 30 %
M PROTEIN MFR SERPL ELPH: 7.5 G/DL (ref 6.1–8.3)
MCH RBC QN AUTO: 30 PG (ref 27–33.2)
MCHC RBC AUTO-ENTMCNC: 33.6 G/DL (ref 31–37)
MCV RBC AUTO: 89.3 FL (ref 81–100)
MONOCYTES # BLD AUTO: 0.81 X10(3) UL (ref 0.1–1)
MONOCYTES NFR BLD AUTO: 8.4 %
NEUTROPHIL ABS PRELIM: 5.74 X10 (3) UL (ref 1.3–6.7)
NEUTROPHILS # BLD AUTO: 5.74 X10(3) UL (ref 1.3–6.7)
NEUTROPHILS NFR BLD AUTO: 59.8 %
NITRITE UR QL STRIP.AUTO: NEGATIVE
OSMOLALITY SERPL CALC.SUM OF ELEC: 292 MOSM/KG (ref 275–295)
PH UR STRIP.AUTO: 6 [PH] (ref 4.5–8)
PLATELET # BLD AUTO: 239 10(3)UL (ref 150–450)
POCT LOT NUMBER: NORMAL
POCT URINE PREGNANCY: NEGATIVE
POTASSIUM SERPL-SCNC: 3.4 MMOL/L (ref 3.6–5.1)
PROT UR STRIP.AUTO-MCNC: NEGATIVE MG/DL
RBC # BLD AUTO: 4.57 X10(6)UL (ref 3.8–5.1)
RBC UR QL AUTO: NEGATIVE
RED CELL DISTRIBUTION WIDTH-SD: 41.4 FL (ref 35.1–46.3)
SODIUM SERPL-SCNC: 141 MMOL/L (ref 136–144)
SP GR UR STRIP.AUTO: 1.02 (ref 1–1.03)
UROBILINOGEN UR STRIP.AUTO-MCNC: <2 MG/DL
WBC # BLD AUTO: 9.6 X10(3) UL (ref 4–13)

## 2018-07-27 PROCEDURE — 87086 URINE CULTURE/COLONY COUNT: CPT | Performed by: EMERGENCY MEDICINE

## 2018-07-27 PROCEDURE — 80053 COMPREHEN METABOLIC PANEL: CPT | Performed by: EMERGENCY MEDICINE

## 2018-07-27 PROCEDURE — 81025 URINE PREGNANCY TEST: CPT

## 2018-07-27 PROCEDURE — 99285 EMERGENCY DEPT VISIT HI MDM: CPT

## 2018-07-27 PROCEDURE — 83690 ASSAY OF LIPASE: CPT | Performed by: EMERGENCY MEDICINE

## 2018-07-27 PROCEDURE — 85025 COMPLETE CBC W/AUTO DIFF WBC: CPT | Performed by: EMERGENCY MEDICINE

## 2018-07-27 PROCEDURE — 74177 CT ABD & PELVIS W/CONTRAST: CPT | Performed by: EMERGENCY MEDICINE

## 2018-07-27 PROCEDURE — 96361 HYDRATE IV INFUSION ADD-ON: CPT

## 2018-07-27 PROCEDURE — 96375 TX/PRO/DX INJ NEW DRUG ADDON: CPT

## 2018-07-27 PROCEDURE — 81001 URINALYSIS AUTO W/SCOPE: CPT | Performed by: EMERGENCY MEDICINE

## 2018-07-27 PROCEDURE — 84702 CHORIONIC GONADOTROPIN TEST: CPT | Performed by: EMERGENCY MEDICINE

## 2018-07-27 PROCEDURE — 70450 CT HEAD/BRAIN W/O DYE: CPT | Performed by: EMERGENCY MEDICINE

## 2018-07-27 PROCEDURE — 96376 TX/PRO/DX INJ SAME DRUG ADON: CPT

## 2018-07-27 PROCEDURE — 96374 THER/PROPH/DIAG INJ IV PUSH: CPT

## 2018-07-27 RX ORDER — ONDANSETRON 2 MG/ML
4 INJECTION INTRAMUSCULAR; INTRAVENOUS ONCE
Status: COMPLETED | OUTPATIENT
Start: 2018-07-27 | End: 2018-07-27

## 2018-07-27 RX ORDER — ONDANSETRON 4 MG/1
4 TABLET, ORALLY DISINTEGRATING ORAL EVERY 4 HOURS PRN
Qty: 20 TABLET | Refills: 0 | Status: ON HOLD | OUTPATIENT
Start: 2018-07-27 | End: 2018-09-05

## 2018-07-27 RX ORDER — KETOROLAC TROMETHAMINE 30 MG/ML
30 INJECTION, SOLUTION INTRAMUSCULAR; INTRAVENOUS ONCE
Status: COMPLETED | OUTPATIENT
Start: 2018-07-27 | End: 2018-07-27

## 2018-07-27 RX ORDER — MORPHINE SULFATE 4 MG/ML
4 INJECTION, SOLUTION INTRAMUSCULAR; INTRAVENOUS EVERY 30 MIN PRN
Status: DISCONTINUED | OUTPATIENT
Start: 2018-07-27 | End: 2018-07-27

## 2018-07-27 NOTE — ED PROVIDER NOTES
Patient Seen in: [facility] Emergency Department    History   Patient presents with:  Nausea/Vomiting/Diarrhea (gastrointestinal)    Stated Complaint: DNC a few weeks ago at [facility].      HPI    This is 18-year-old female who states that she started havin 147/94  Pulse: 92  Resp: 18  Temp: 98.5 °F (36.9 °C)  Temp src: Temporal  SpO2: 100 %  O2 Device: None (Room air)    Current:BP (!) 130/99   Pulse 89   Temp 98.5 °F (36.9 °C) (Temporal)   Resp 18   Ht 165.1 cm (5' 5\")   Wt 68 kg   LMP 04/24/2018   SpO2 99 were created for panel order CBC WITH DIFFERENTIAL WITH PLATELET.   Procedure                               Abnormality         Status                     ---------                               -----------         ------                     CBC W/ DIFFERFRANK minimally heterogenous enhancement, and intermediate T1 signal.  This lesion remains indeterminate but could represent a collapsing corpus luteal cyst, with other etiologies not entirely excluded. Clinical correlation recommended.   No definite MRI evidenc Syncopal episode today with pain and fell hitting forehead on concrete. Also nausea and vomiting and not able to keep even water down.   TECHNIQUE:  CT scanning was performed from the dome of the diaphragm to the pubic symphysis with non-ionic intravenous 20 x 16 mm. Significance uncertain, but given the right-sided pain consider pelvic ultrasound. No free fluid or generalized ascites. No bowel obstruction, free air, pleural effusion, or abnormality of the solid organs of the abdomen.   Dictated by: Rosaura Schlatter soft and nontender I discussed this case extensively that she wants to go home. Discussed she is going to go home close follow-up with her primary MD she does not want an ultrasound at this present she wants to go home.   I discussed that she should have t

## 2018-07-31 PROCEDURE — 76856 US EXAM PELVIC COMPLETE: CPT | Performed by: OBSTETRICS & GYNECOLOGY

## 2018-08-17 ENCOUNTER — TELEPHONE (OUTPATIENT)
Dept: OBGYN CLINIC | Facility: CLINIC | Age: 23
End: 2018-08-17

## 2018-08-17 NOTE — TELEPHONE ENCOUNTER
Patient started her first cycle after her D&C and she feels like it is more heavy and painful than usually. Patient states that she changed three tampons in the past 5 hrs. Patient reassured. Instructed to try Ibuprofen for pain.  If bleeding increases or p

## 2018-08-28 NOTE — ED INITIAL ASSESSMENT (HPI)
Patient presents after boyfriend called because she was displaying manic behaviors.  Her speech is extremely pressured, her conversations are all over the place and she's speaking extremely fast, telling me about a , an ex boyfriend, a current boyfri

## 2018-08-28 NOTE — ED PROVIDER NOTES
Patient Seen in: [facility] Emergency Department    History   Patient presents with:  Eval-P (psychiatric)    Stated Complaint: eval p    HPI    Patient is a 17-year-old female who has history of bipolar disorder.   Patient states he previously was t except as noted above.     Physical Exam   ED Triage Vitals  BP: 134/90 [08/28/18 1525]  Pulse: 102 [08/28/18 1525]  Resp: 15 [08/28/18 1525]  Temp: 97.7 °F (36.5 °C) [08/28/18 1525]  Temp src: Temporal [08/28/18 1525]  SpO2: 100 % [08/28/18 1600]  O2 Devic Abnormality         Status                     ---------                               -----------         ------                     CBC W/ DIFFERENTIAL[[id number]]                              Final result                 Please view results for these burke

## 2018-08-29 PROBLEM — F31.12 BIPOLAR 1 DISORDER, MANIC, MODERATE (HCC): Status: ACTIVE | Noted: 2018-08-29

## 2018-08-29 NOTE — BH LEVEL OF CARE ASSESSMENT
Level of Care Assessment Note    General Questions  Why are you here?: Patient is a 20 gage old female who arrived to [facility] ED via EMS. Pt states \"My boyfriend came over and was stalking me. He's worried about me because I have Bipolar. \"   Precipitating Randi This writer spoke with the Pt boyfriend Cecily Francois Via phone. Cecily Francois reports that the Pt has been acting erratically for the last month, and hasn't been taking her bipolar medication.  Pt boyfriend says that the Pt has been very manic and will start talking fast an EMS)    Suicide Risk  Source of information for CSSR: Patient  In what setting is the screener performed?: in person  1. Have you wished you were dead or wished you could go to sleep and not wake up? (past 30 days): No  2.  Have you actually had any thought of Sleep Aids: Pt denies  Appetite Symptoms: Decreased  Unplanned Weight Loss: No  Unplanned Weight Gain: No  History of Eating Disorder: No  Active Eating Disorder: No                 Current/Previous MH/CD Providers  Hospitalizations, Placements, Therapy denies  Current Withdrawal Symptoms: No  Breathalyzer:  (BAL 3 per Labs in ED @ 16:30 PM (8/28/18) )    Compulsive Behaviors  Are you/others concerned about any of the following behaviors over the past 30 days?: Denies by: Due to most recent actions.    Thought Patterns  Clarity/Relevance: Coherent  Flow: Pressured  Content: Ordinary  Level of Consciousness: Alert  Level of Consciousness: Alert  Behavior  Exhibited behavior: Appropriate to situation    Assessment Summary Provided: Call 911 in an Emergency;[facility] Crisis Line Number;Advised to call if condition worsens; Advised to call with questions    Primary Psychiatric Diagnosis  Bipolar and Related Disorders: Bipolar Disorder, Current or Most Recent Episode Manic  Bipolar D

## 2018-08-29 NOTE — ED PROVIDER NOTES
Patient has been resting comfortably throughout my shift. Awaiting placement. Patient is medically stable for transfer to psychiatric facility.

## 2018-08-29 NOTE — BH LEVEL OF CARE ASSESSMENT
Level of Care Assessment Note    General Questions  Why are you here?: Patient is a 20 gage old female who arrived to [facility] ED via EMS. Pt states \"My boyfriend came over and was stalking me. He's worried about me because I have Bipolar. \"   Precipitating Randi This writer spoke with the Pt boyfriend Sallieelton Resendiz Via phone. Severiano Peralestere reports that the Pt has been acting erratically for the last month, and hasn't been taking her bipolar medication.  Pt boyfriend says that the Pt has been very manic and will start talking fast an EMS)    Suicide Risk  Source of information for CSSR: Patient  In what setting is the screener performed?: in person  1. Have you wished you were dead or wished you could go to sleep and not wake up? (past 30 days): No  2.  Have you actually had any thought of Sleep Aids: Pt denies  Appetite Symptoms: Decreased  Unplanned Weight Loss: No  Unplanned Weight Gain: No  History of Eating Disorder: No  Active Eating Disorder: No                 Current/Previous MH/CD Providers  Hospitalizations, Placements, Therapy denies  Current Withdrawal Symptoms: No  Breathalyzer:  (BAL 3 per Labs in ED @ 16:30 PM (8/28/18) )    Compulsive Behaviors  Are you/others concerned about any of the following behaviors over the past 30 days?: Denies by: Due to most recent actions.    Thought Patterns  Clarity/Relevance: Coherent  Flow: Pressured  Content: Ordinary  Level of Consciousness: Alert  Level of Consciousness: Alert  Behavior  Exhibited behavior: Appropriate to situation    Assessment Summary Provided: Call 911 in an Emergency;[facility] Crisis Line Number;Advised to call if condition worsens; Advised to call with questions    Primary Psychiatric Diagnosis  Bipolar and Related Disorders: Bipolar Disorder, Current or Most Recent Episode Manic  Bipolar D

## 2018-08-29 NOTE — ED PROVIDER NOTES
Patient is resting comfortably throughout my shift. No events. Patient awaiting placement by [facility].

## 2018-09-07 NOTE — ED NOTES
Patient now awake and responding and states that she barely had any sleep last night and threw up today because she had nothing to eat yet today. Patient is tearful and crying and states that she needs help. Patient states she smoked a little weed today.

## 2018-09-07 NOTE — ED PROVIDER NOTES
Patient Seen in: [facility] Emergency Department    History   Patient presents with:  Eval-P (psychiatric)    Stated Complaint: eval-p    HPI    17-year-old female brought in by coworkers due to reports of bizarre behavior.   The patient was recently air)    Current:/90   Pulse 91   Temp 98.1 °F (36.7 °C)   Resp 18   Ht 165.1 cm (5' 5\")   Wt 59 kg   SpO2 100%   BMI 21.64 kg/m²         Physical Exam  General: This a 24-year-old female who knows she is in a hospital but continues to have bizarre a Positive (*)     All other components within normal limits    Narrative:     Results of the Urine Drug Screen should be used only for medical purposes.    CBC W/ DIFFERENTIAL - Abnormal; Notable for the following:     WBC 13.1 (*)     Neutrophil Absolute Pr medications for the patient and sent into her pharmacy. She has a safe place to go. She was discharged in good condition.           Disposition and Plan     Clinical Impression:  Bipolar disorder, current episode mixed, moderate ([address]  (primary encounter

## 2018-09-07 NOTE — ED INITIAL ASSESSMENT (HPI)
Patient brought here by her boss who states that the patient had an episode of severe vomiting and abdominal pain. Patient arrives not responding to questions but awake with a blank stare.   Her Leon Link states that she stopped talking about 10 minutes prior t

## 2018-09-07 NOTE — ED NOTES
Spoke with Arvind Castillo, Patient's father. Updated him that patient is in the ER and is safe at this time. He will call back for updates.

## 2018-09-08 NOTE — BH LEVEL OF CARE ASSESSMENT
Level of Care Assessment Note    General Questions  Why are you here?: I had a panic attack, erratic behavior at work. Pt. believes she had a \"total panic attack\", took on too many things, spiraled.  Pt was \"too quickly\" doing everything instead of doin having anxiety at work and pt agreed to come in to follow up and states she was over tired. Family's Biggest Areas of Concern: Avila Glover reports that he is unable to help Ariane due to distance, but he is willing to have her come stay with him.  Benito bhatti (n/a)  Do you have a firearm owner ID card?: No  Collateral for any access to means/firearms/weapons:  (n/a)    Self Injury  History of Self Injurious Behaviors: No  Present Self-Injurious Behaviors: No    Mental Health Symptoms  Hallucination Type: No pr and Prescription Drug Use  Which if any illicit/prescription drugs have you used/abused?: Cannabis    Cannabis Use  Age at first use?: MARIJUANA == AGE 1st USE:   Route: Smoked  Last Use?: URINE DRUG SCREEN + FOR CANNANIS ONLY  //  LAST USE:   Is your curr Relaxed  Rate of Movement: Normal  Mood and Affect  Mood or Feelings: Anxious  Anxiety Level- MARCEL only:  Moderate  Appropriateness of Affect: Appropriate to situation  Range of Affect: Normal  Stability of Affect: Stable  Attitude toward staff: Co-operative and states she has follow up appointments with [address] [place] [address] next week. Risk/Protective Factors  Risk Factors: Pattern of impulsive decision making; No current treatment;Substance use or abuse; Change in treatment; Environmental stress

## 2021-02-18 ENCOUNTER — TELEPHONE (OUTPATIENT)
Dept: OBGYN CLINIC | Facility: CLINIC | Age: 26
End: 2021-02-18

## 2021-02-18 NOTE — TELEPHONE ENCOUNTER
Prior patient phoned requesting medical records. Explained that she would need to sign a release. [employer] were down at the time and this note is being added later.        Please phone pt about what records she wants and where we should sent the rel

## 2021-02-18 NOTE — TELEPHONE ENCOUNTER
Patient calling back to state she would just like a nurse to call her and let her know the names of the procedures she had done in 2018. Please advise. Patient also having trouble accessing her [employer] - gave her the MyChart help phone number.

## 2021-02-18 NOTE — TELEPHONE ENCOUNTER
Patient provided with the information for the procedures done. Questions answered. Patient also asking about Emergency birth control, wants to know if Plan B is safe since she has history of Pulmonary Embolism.  Patient advised to contact her current provi

## 2021-06-26 NOTE — ED NOTES
8/28/18    Dr. Kim Mar recommends [address]    Notified [address]    Pt Is self pay and will need to be transferred out.      ED physician & RN Notified     [employer] scanned into EPIC
8/29/18    This writer met with Pt for a re-assessment. Pt presented calm and cooperative. Pt A&OX4. Pt states she is feeling much better today. Pt says that when she spoke with this writer yesterday she was just given Ativan and was out of it.  Pt says
All of the patients belongings are removed  They are bagged/labeled and put into 1 large smart bag  Bag #[id number]  In the bag includes   #shirt   #bra   #pants   #underwear   #sandles  The bag is labeled and put behind POD B nurses station next to the delano
Dr Rosana Rosenberg was at the bedside
[facility] CRISIS WORKER AT BEDSIDE
ORDERED LUNCH FOR PT.
One to one seclusion order was ordered and started at 1530
Patient asked to go to the bathroom  Provided her with socks to wear while she walked  She put them around her wrists  I helped her put them on her feet  I explained how to collect a clean catch urine sample  Patient came out of the bathroom with the urine
Patient was given a glass of water
Patient's boyfriend Donna Ware arrived to bring patient's belongings. He delivered an ipad, patient's wallet and , which were all placed in a smart safe bag and labelled with patient's name.  He reports that patient's wallet was disheveled when he grabbed
Patient's father called requesting update. Patient agreed to speak with father on the phone. She is calm and cooperative at this time.
Patient's father called. Patient asked if information could be released, she declined. Informed patient that we could not release any further information. He left phone number and would like to speak with patient if she would like to speak with him.
Pt awake, calling nurse in room every few minutes, conversation is all over the place, pt wanting to talk with Police about her ex-boyfriend. Pt pacing and requesting to exercise at this hour.  Pt unable to provide nurse with dosages of home meds, RN
Pt given gatorade and snacks to eat, pt calm and cooperative.
Pt resting comfortably after IV Ativan,
Pt sleeping in room.
Pt sleeping soundly.
Pt up to restroom with PCT. Pt's gait steady.
Pt was re evaluated by crisis worker Reece Walton from [facility]. Pt's acuity level for placement has been changed and pt will be assigned a bed at [facility] in adult unit. Pt is aware and is calm and cooperative at present.
RESTRICTION OF RIGHTS FORM WAS COMPLETED AND PLACED ON PATIENT'S CHART. COPY WAS STAPLED TO PATIENT'S SMART SAFE BAG.
Report was called to Damian Arora at adult psyche unit. Pt to transferred to [facility] via ambulance on stretcher and belongings will be given to medics.
Spoke to Bettina at [facility]. \"Will be at bottom of list\". Pt would be last, needs to go through [facility] due to \"not in region\". Can be multiple days till reviewed or possible placement.
Spoke with Flory at [facility]. No females beds at this time, call after 9am today.
Spoke with Silke at [facility]. No female bed at this time, call after 9am today.
Walk in

## (undated) DEVICE — SOL  .9 3000ML

## (undated) DEVICE — GLOVE SURG SENSICARE SZ 6-1/2

## (undated) DEVICE — SUTURE VICRYL 0 UR-6

## (undated) DEVICE — UNDYED BRAIDED (POLYGLACTIN 910), SYNTHETIC ABSORBABLE SUTURE: Brand: COATED VICRYL

## (undated) DEVICE — 3M™ STERI-STRIP™ REINFORCED ADHESIVE SKIN CLOSURES, R1541, 1/4 IN X 3 IN (6 MM X 75 MM), 3 STRIPS/ENVELOPE: Brand: 3M™ STERI-STRIP™

## (undated) DEVICE — TROCAR: Brand: KII SHIELDED BLADED ACCESS SYSTEM

## (undated) DEVICE — TUBING CYSTO

## (undated) DEVICE — SOL  .9 1000ML BTL

## (undated) DEVICE — TROCAR: Brand: KII® SLEEVE

## (undated) DEVICE — 1200CC GUARDIAN II: Brand: GUARDIAN

## (undated) DEVICE — KENDALL SCD EXPRESS SLEEVES, KNEE LENGTH, MEDIUM: Brand: KENDALL SCD

## (undated) DEVICE — GLOVE SURG TRIUMPH SZ 7

## (undated) DEVICE — GYN LAP CDS: Brand: MEDLINE INDUSTRIES, INC.

## (undated) DEVICE — DISSECTOR SONICISION CORDLESS

## (undated) DEVICE — CURRETTE 7MM CVD

## (undated) DEVICE — GLOVE SURG TRIUMPH SZ 6-1/2

## (undated) DEVICE — BANDAGE FLEX ADH CURITY 2X3

## (undated) DEVICE — GYN CDS: Brand: MEDLINE INDUSTRIES, INC.

## (undated) DEVICE — SUTURE VICRYL 0

## (undated) NOTE — LETTER
[facility]  [address] [facility], [address] [place] [address]    Consent for Operation    Date: __________________    Time: _______________    1.  I authorize the performance upon Danny Yonah the following operation:    Procedure(s):  [employer] procedure has been videotaped, the surgeon will obtain the original videotape. The hospital will not be responsible for storage or maintenance of this tape.     6. For the purpose of advancing medical education, I consent to the admittance of observers to t STATEMENTS REQUIRING INSERTION OR COMPLETION WERE FILLED IN.     Signature of Patient:   ___________________________    When the patient is a minor or mentally incompetent to give consent:  Signature of person authorized to consent for patient: ____________ supplements, and pills I can buy without a prescription (including street drugs/illegal medications). Failure to inform my anesthesiologist about these medicines may increase my risk of anesthetic complications.   · If I am allergic to anything or have had Anesthesiologist Signature     Date   Time  I have discussed the procedure and information above with the patient (or patient’s representative) and answered their questions. The patient or their representative has agreed to have anesthesia services.     ___

## (undated) NOTE — LETTER
[facility]  [address] 59 [address] [facility], [address] [place] [address]    Consent for Operation    Date: __________________    Time: _______________    1.  I authorize the performance upon Karri Barger the following operation:    Procedure(s):  HYST procedure has been videotaped, the surgeon will obtain the original videotape. The hospital will not be responsible for storage or maintenance of this tape.     6. For the purpose of advancing medical education, I consent to the admittance of observers to t STATEMENTS REQUIRING INSERTION OR COMPLETION WERE FILLED IN.     Signature of Patient:   ___________________________    When the patient is a minor or mentally incompetent to give consent:  Signature of person authorized to consent for patient: ____________ supplements, and pills I can buy without a prescription (including street drugs/illegal medications). Failure to inform my anesthesiologist about these medicines may increase my risk of anesthetic complications.   · If I am allergic to anything or have had Anesthesiologist Signature     Date   Time  I have discussed the procedure and information above with the patient (or patient’s representative) and answered their questions. The patient or their representative has agreed to have anesthesia services.     ___

## (undated) NOTE — LETTER
Date & Time: 6/26/2018, 4:27 PM  Patient: Cierra Toro  Encounter Provider(s):    Tomasa Cruz MD       To Whom It May Concern:    Obdulia Ricketts was seen and treated in our department on 6/26/2018.  She may not return to work until

## (undated) NOTE — ED AVS SNAPSHOT
[address]   MRN: [id number]    Department:  [facility] Emergency Department   Date of Visit:  6/24/2018           Disclosure     Insurance plans vary and the physician(s) referred by the ER may not be covered by your plan.  Pl tell this physician (or your personal doctor if your instructions are to return to your personal doctor) about any new or lasting problems. The primary care or specialist physician will see patients referred from the [facility] Emergency Department.  Wilmer Maddox

## (undated) NOTE — LETTER
Date & Time: 6/24/2018, 11:59 PM  Patient: Karri Barger  Encounter Provider(s):    Caterina Richter MD       To Whom It May Concern:    Miguelangel Short was seen and treated in our department on 6/24/2018.  She May return to work on 6

## (undated) NOTE — ED AVS SNAPSHOT
[id number]   MRN: [id number]    Department:  [facility] Emergency Department   Date of Visit:  7/27/2018           Disclosure     Insurance plans vary and the physician(s) referred by the ER may not be covered by your plan.  Pl tell this physician (or your personal doctor if your instructions are to return to your personal doctor) about any new or lasting problems. The primary care or specialist physician will see patients referred from the [facility] Emergency Department.  Loida Hdz

## (undated) NOTE — ED AVS SNAPSHOT
[id number]   MRN: [id number]    Department:  [facility] Emergency Department   Date of Visit:  9/7/2018           Disclosure     Insurance plans vary and the physician(s) referred by the ER may not be covered by your plan.  Ple tell this physician (or your personal doctor if your instructions are to return to your personal doctor) about any new or lasting problems. The primary care or specialist physician will see patients referred from the [facility] Emergency Department.  Ezequiel Hill